# Patient Record
Sex: FEMALE | Race: OTHER | HISPANIC OR LATINO
[De-identification: names, ages, dates, MRNs, and addresses within clinical notes are randomized per-mention and may not be internally consistent; named-entity substitution may affect disease eponyms.]

---

## 2017-06-30 PROBLEM — Z00.00 ENCOUNTER FOR PREVENTIVE HEALTH EXAMINATION: Status: ACTIVE | Noted: 2017-06-30

## 2017-08-16 ENCOUNTER — LABORATORY RESULT (OUTPATIENT)
Age: 47
End: 2017-08-16

## 2017-08-16 ENCOUNTER — APPOINTMENT (OUTPATIENT)
Dept: RHEUMATOLOGY | Facility: CLINIC | Age: 47
End: 2017-08-16
Payer: COMMERCIAL

## 2017-08-16 VITALS
BODY MASS INDEX: 31.07 KG/M2 | RESPIRATION RATE: 17 BRPM | DIASTOLIC BLOOD PRESSURE: 68 MMHG | HEART RATE: 75 BPM | SYSTOLIC BLOOD PRESSURE: 114 MMHG | WEIGHT: 144 LBS | OXYGEN SATURATION: 95 % | HEIGHT: 57 IN

## 2017-08-16 DIAGNOSIS — Z82.61 FAMILY HISTORY OF ARTHRITIS: ICD-10-CM

## 2017-08-16 DIAGNOSIS — Z83.3 FAMILY HISTORY OF DIABETES MELLITUS: ICD-10-CM

## 2017-08-16 DIAGNOSIS — Z87.42 PERSONAL HISTORY OF OTHER DISEASES OF THE FEMALE GENITAL TRACT: ICD-10-CM

## 2017-08-16 DIAGNOSIS — R73.03 PREDIABETES.: ICD-10-CM

## 2017-08-16 DIAGNOSIS — Z87.19 PERSONAL HISTORY OF OTHER DISEASES OF THE DIGESTIVE SYSTEM: ICD-10-CM

## 2017-08-16 PROCEDURE — 99205 OFFICE O/P NEW HI 60 MIN: CPT | Mod: 25

## 2017-08-16 PROCEDURE — 36415 COLL VENOUS BLD VENIPUNCTURE: CPT

## 2017-08-19 LAB
ACE BLD-CCNC: 28 U/L
ALBUMIN SERPL ELPH-MCNC: 4.3 G/DL
ALP BLD-CCNC: 80 U/L
ALT SERPL-CCNC: 34 U/L
ANA PAT FLD IF-IMP: ABNORMAL
ANA SER IF-ACNC: ABNORMAL
ANION GAP SERPL CALC-SCNC: 12 MMOL/L
AST SERPL-CCNC: 27 U/L
B BURGDOR IGG+IGM SER QL IB: NORMAL
BASOPHILS # BLD AUTO: 0.01 K/UL
BASOPHILS NFR BLD AUTO: 0.2 %
BILIRUB SERPL-MCNC: 0.6 MG/DL
BUN SERPL-MCNC: 13 MG/DL
CALCIUM SERPL-MCNC: 8.9 MG/DL
CCP AB SER IA-ACNC: <8 UNITS
CHLORIDE SERPL-SCNC: 106 MMOL/L
CK SERPL-CCNC: 146 U/L
CO2 SERPL-SCNC: 23 MMOL/L
CREAT SERPL-MCNC: 0.77 MG/DL
CRP SERPL-MCNC: 0.8 MG/DL
DEPRECATED KAPPA LC FREE/LAMBDA SER: 1.16 RATIO
DSDNA AB SER-ACNC: <12 IU/ML
ENA SS-A AB SER IA-ACNC: <0.2 AL
ENA SS-B AB SER IA-ACNC: <0.2 AL
ENDOMYSIUM IGA SER QL: NORMAL
ENDOMYSIUM IGA TITR SER: NORMAL
EOSINOPHIL # BLD AUTO: 0.05 K/UL
EOSINOPHIL NFR BLD AUTO: 1.2 %
ERYTHROCYTE [SEDIMENTATION RATE] IN BLOOD BY WESTERGREN METHOD: 19 MM/HR
GLIADIN IGA SER QL: 6.9 UNITS
GLIADIN IGG SER QL: <5 UNITS
GLIADIN PEPTIDE IGA SER-ACNC: NEGATIVE
GLIADIN PEPTIDE IGG SER-ACNC: NEGATIVE
GLUCOSE SERPL-MCNC: 87 MG/DL
HAV IGM SER QL: NONREACTIVE
HBV CORE IGM SER QL: NONREACTIVE
HBV SURFACE AG SER QL: NONREACTIVE
HCT VFR BLD CALC: 36.4 %
HCV AB SER QL: NONREACTIVE
HCV S/CO RATIO: 0.16 S/CO
HGB BLD-MCNC: 11.5 G/DL
IGA SER QL IEP: 315 MG/DL
IGG SER QL IEP: 1390 MG/DL
IGM SER QL IEP: 123 MG/DL
IMM GRANULOCYTES NFR BLD AUTO: 0 %
KAPPA LC CSF-MCNC: 1.91 MG/DL
KAPPA LC SERPL-MCNC: 2.22 MG/DL
LDH SERPL-CCNC: 167 U/L
LYMPHOCYTES # BLD AUTO: 1.89 K/UL
LYMPHOCYTES NFR BLD AUTO: 46.1 %
M PROTEIN SPEC IFE-MCNC: NORMAL
MAGNESIUM SERPL-MCNC: 2.2 MG/DL
MAN DIFF?: NORMAL
MCHC RBC-ENTMCNC: 26.6 PG
MCHC RBC-ENTMCNC: 31.6 GM/DL
MCV RBC AUTO: 84.3 FL
MONOCYTES # BLD AUTO: 0.37 K/UL
MONOCYTES NFR BLD AUTO: 9 %
NEUTROPHILS # BLD AUTO: 1.78 K/UL
NEUTROPHILS NFR BLD AUTO: 43.5 %
PHOSPHATE SERPL-MCNC: 3.2 MG/DL
PLATELET # BLD AUTO: 222 K/UL
POTASSIUM SERPL-SCNC: 4.4 MMOL/L
PROT SERPL-MCNC: 7.6 G/DL
RBC # BLD: 4.32 M/UL
RBC # FLD: 15.3 %
RF+CCP IGG SER-IMP: NEGATIVE
RHEUMATOID FACT SER QL: 28 IU/ML
SODIUM SERPL-SCNC: 141 MMOL/L
THYROGLOB AB SERPL-ACNC: <20 IU/ML
THYROPEROXIDASE AB SERPL IA-ACNC: <10 IU/ML
TTG IGA SER IA-ACNC: <5 UNITS
TTG IGA SER-ACNC: NEGATIVE
TTG IGG SER IA-ACNC: 7.3 UNITS
TTG IGG SER IA-ACNC: NEGATIVE
URATE SERPL-MCNC: 4.7 MG/DL
WBC # FLD AUTO: 4.1 K/UL

## 2017-08-20 PROBLEM — Z83.3 FAMILY HISTORY OF DIABETES MELLITUS: Status: ACTIVE | Noted: 2017-08-16

## 2017-08-20 PROBLEM — Z82.61 FAMILY HISTORY OF ARTHRITIS: Status: ACTIVE | Noted: 2017-08-16

## 2017-08-20 PROBLEM — Z87.19 HISTORY OF GASTRITIS: Status: RESOLVED | Noted: 2017-08-16 | Resolved: 2017-08-20

## 2017-08-20 PROBLEM — Z87.42 HISTORY OF IRREGULAR MENSTRUAL CYCLES: Status: RESOLVED | Noted: 2017-08-20 | Resolved: 2017-08-20

## 2017-08-20 RX ORDER — PANTOPRAZOLE 40 MG/1
40 TABLET, DELAYED RELEASE ORAL
Refills: 0 | Status: ACTIVE | COMMUNITY

## 2017-08-25 LAB — HLA-B27 RELATED AG QL: NORMAL

## 2018-10-15 ENCOUNTER — APPOINTMENT (OUTPATIENT)
Dept: UROLOGY | Facility: CLINIC | Age: 48
End: 2018-10-15
Payer: COMMERCIAL

## 2018-10-15 VITALS
HEIGHT: 57 IN | BODY MASS INDEX: 31.28 KG/M2 | DIASTOLIC BLOOD PRESSURE: 74 MMHG | SYSTOLIC BLOOD PRESSURE: 109 MMHG | HEART RATE: 70 BPM | OXYGEN SATURATION: 97 % | WEIGHT: 145 LBS

## 2018-10-15 DIAGNOSIS — R10.9 UNSPECIFIED ABDOMINAL PAIN: ICD-10-CM

## 2018-10-15 DIAGNOSIS — R30.0 DYSURIA: ICD-10-CM

## 2018-10-15 PROCEDURE — 99204 OFFICE O/P NEW MOD 45 MIN: CPT | Mod: 25

## 2018-10-15 PROCEDURE — 51798 US URINE CAPACITY MEASURE: CPT

## 2018-10-16 LAB
APPEARANCE: ABNORMAL
BACTERIA: ABNORMAL
BILIRUBIN URINE: NEGATIVE
BLOOD URINE: ABNORMAL
COLOR: YELLOW
GLUCOSE QUALITATIVE U: NEGATIVE MG/DL
HYALINE CASTS: 4 /LPF
KETONES URINE: NEGATIVE
LEUKOCYTE ESTERASE URINE: ABNORMAL
MICROSCOPIC-UA: NORMAL
NITRITE URINE: NEGATIVE
PH URINE: 5.5
PROTEIN URINE: NEGATIVE MG/DL
RED BLOOD CELLS URINE: 6 /HPF
SPECIFIC GRAVITY URINE: 1.01
SQUAMOUS EPITHELIAL CELLS: 1 /HPF
UROBILINOGEN URINE: NEGATIVE MG/DL
WHITE BLOOD CELLS URINE: 133 /HPF

## 2018-10-19 LAB — BACTERIA UR CULT: ABNORMAL

## 2018-10-29 ENCOUNTER — APPOINTMENT (OUTPATIENT)
Dept: UROLOGY | Facility: CLINIC | Age: 48
End: 2018-10-29

## 2018-10-29 DIAGNOSIS — N39.0 URINARY TRACT INFECTION, SITE NOT SPECIFIED: ICD-10-CM

## 2019-01-08 ENCOUNTER — APPOINTMENT (OUTPATIENT)
Dept: RHEUMATOLOGY | Facility: CLINIC | Age: 49
End: 2019-01-08
Payer: MEDICAID

## 2019-01-08 VITALS
WEIGHT: 145 LBS | HEART RATE: 78 BPM | HEIGHT: 57 IN | BODY MASS INDEX: 31.28 KG/M2 | TEMPERATURE: 99.6 F | SYSTOLIC BLOOD PRESSURE: 100 MMHG | DIASTOLIC BLOOD PRESSURE: 70 MMHG | OXYGEN SATURATION: 98 % | RESPIRATION RATE: 17 BRPM

## 2019-01-08 DIAGNOSIS — G47.00 INSOMNIA, UNSPECIFIED: ICD-10-CM

## 2019-01-08 DIAGNOSIS — Z87.39 PERSONAL HISTORY OF OTHER DISEASES OF THE MUSCULOSKELETAL SYSTEM AND CONNECTIVE TISSUE: ICD-10-CM

## 2019-01-08 PROCEDURE — 99214 OFFICE O/P EST MOD 30 MIN: CPT

## 2019-01-08 RX ORDER — CYCLOBENZAPRINE HYDROCHLORIDE 10 MG/1
10 TABLET, FILM COATED ORAL
Qty: 30 | Refills: 3 | Status: DISCONTINUED | COMMUNITY
Start: 2017-08-16 | End: 2019-01-08

## 2019-01-08 RX ORDER — CELECOXIB 200 MG/1
200 CAPSULE ORAL
Qty: 30 | Refills: 3 | Status: DISCONTINUED | COMMUNITY
Start: 2017-08-16 | End: 2019-01-08

## 2019-01-08 RX ORDER — SULFAMETHOXAZOLE AND TRIMETHOPRIM 800; 160 MG/1; MG/1
800-160 TABLET ORAL TWICE DAILY
Qty: 14 | Refills: 2 | Status: DISCONTINUED | COMMUNITY
Start: 2018-10-15 | End: 2019-01-08

## 2019-01-08 RX ORDER — PHENAZOPYRIDINE HYDROCHLORIDE 200 MG/1
200 TABLET ORAL
Qty: 9 | Refills: 0 | Status: DISCONTINUED | COMMUNITY
Start: 2018-10-15 | End: 2019-01-08

## 2019-01-08 RX ORDER — CIPROFLOXACIN HYDROCHLORIDE 500 MG/1
500 TABLET, FILM COATED ORAL TWICE DAILY
Qty: 14 | Refills: 1 | Status: DISCONTINUED | COMMUNITY
Start: 2018-10-19 | End: 2019-01-08

## 2019-01-08 RX ORDER — NITROFURANTOIN (MONOHYDRATE/MACROCRYSTALS) 25; 75 MG/1; MG/1
100 CAPSULE ORAL
Qty: 14 | Refills: 0 | Status: DISCONTINUED | COMMUNITY
Start: 2018-10-15 | End: 2019-01-08

## 2019-01-08 NOTE — CONSULT LETTER
[Dear  ___] : Dear  [unfilled], [Consult Letter:] : I had the pleasure of evaluating your patient, [unfilled]. [Please see my note below.] : Please see my note below. [Consult Closing:] : Thank you very much for allowing me to participate in the care of this patient.  If you have any questions, please do not hesitate to contact me. [Sincerely,] : Sincerely, [FreeTextEntry3] : Yohan Hale II, MD\par \par Attending Rheumatologist\par Division of Rheumatology\par [facility]\par     [facility] [facility] &\par     Rheumatology at [place],\par     [facility]\par \par \par [facility]\par \par Contact:\par    [phone], fax [phone] ([place] office)\par    [phone], fax [phone] ([place] office)\par

## 2019-01-08 NOTE — HISTORY OF PRESENT ILLNESS
[___ Month(s) Ago] : [unfilled] month(s) ago [FreeTextEntry1] : - pt still w/ persistent pain diffusely in her body, in particular her lateral elbows, upper arms, upper back, and knees at times\par - last labs showing weak +MEEK 1:80 and borderline RF which is not clinically significant, with negative secondary serologies.  She had trace elevated inflammatory markers w/ ESR/CRP, also not suspect for inflammatory arthritis. She had normal immunoglobulins and CBC/CMP.\par - also recently has been c/o dizziness a/w headaches, sinus pressure. She has an upcoming referral to have this evaluated. She has occasional headaches that are very bothersome, reports at times temporal and occiptal pain along with frontal maxillary and frontal sinus tenderness. She has had some watery nasal discharge at times.  Was given Fioricet by PCP and diclofenac to take prn which was not very helpful.\par - she denies any swelling, redness, warmth of her joints\par - she has diffuse myalgia in her upper back, arms, and legs at times.\par - ROS: denies constitutional sxs of fever/chills, weight loss, alopecia, oral/nasal ulcers, sicca sxs, CP/SOB, hematuria/frothy urine, Raynaud's, rash, nodules, or photosensitivity.\par

## 2019-01-08 NOTE — ASSESSMENT
[FreeTextEntry1] : 48y F w/ chronic history of arthralgias, paraspinal spasm, and myalgia.  She has mild degenerative osteoarthritis of her knees and R shoulder.  She also has some mild bilateral low back pain with with radiation to the bilateral lower extremities as far as the bilateral ankles. \par She has multiple tender points, diffuse myalgia, hyperesthesia, poor restorative sleep and insomnia, chronic fatigue secondary to fibromyalgia. She has bilateral bicipital tendonitis, bilateral lateral epicondylitis, and bilateral anserine bursitis. She has not been exercising as much as previously due to some of her pain.  \par Previous labs/serologies reviewed and no evidence of systemic inflammatory autoimmune rheumatic disease \par \par - Continue other current medications (other than those listed below) \par - Continue daily exercise for at least 30 minutes per day-emphasized \par - start duloxetine 30mg/d. R/B/A reviewed\par - start amitriptyline 10mg QHS (aid in sleep and FM)\par \par RTO 6 weeks

## 2019-01-08 NOTE — PHYSICAL EXAM
[General Appearance - Alert] : alert [General Appearance - In No Acute Distress] : in no acute distress [General Appearance - Well Nourished] : well nourished [General Appearance - Well Developed] : well developed [General Appearance - Well-Appearing] : healthy appearing [Sclera] : the sclera and conjunctiva were normal [PERRL With Normal Accommodation] : pupils were equal in size, round, and reactive to light [Extraocular Movements] : extraocular movements were intact [Outer Ear] : the ears and nose were normal in appearance [Hearing Threshold Finger Rub Not [place]] : hearing was normal [Examination Of The Oral Cavity] : the lips and gums were normal [Nasal Cavity] : the nasal mucosa and septum were normal [Oropharynx] : the oropharynx was normal [Neck Appearance] : the appearance of the neck was normal [Neck Cervical Mass (___cm)] : no neck mass was observed [Jugular Venous Distention Increased] : there was no jugular-venous distention [Thyroid Diffuse Enlargement] : the thyroid was not enlarged [Thyroid Nodule] : there were no palpable thyroid nodules [Respiration, Rhythm And Depth] : normal respiratory rhythm and effort [Auscultation Breath Sounds / Voice Sounds] : lungs were clear to auscultation bilaterally [Lungs Percussion] : the lungs were normal to percussion [Heart Rate And Rhythm] : heart rate was normal and rhythm regular [Heart Sounds] : normal S1 and S2 [Heart Sounds Gallop] : no gallops [Murmurs] : no murmurs [Heart Sounds Pericardial Friction Rub] : no pericardial rub [Full Pulse] : the pedal pulses are present [Edema] : there was no peripheral edema [Veins - Varicosity Changes] : there were no varicosital changes [Bowel Sounds] : normal bowel sounds [Abdomen Soft] : soft [Abdomen Tenderness] : non-tender [Abdomen Mass (___ Cm)] : no abdominal mass palpated [Cervical Lymph Nodes Enlarged Posterior Bilaterally] : posterior cervical [Cervical Lymph Nodes Enlarged Anterior Bilaterally] : anterior cervical [Supraclavicular Lymph Nodes Enlarged Bilaterally] : supraclavicular [Axillary Lymph Nodes Enlarged Bilaterally] : axillary [No CVA Tenderness] : no ~M costovertebral angle tenderness [No Spinal Tenderness] : no spinal tenderness [Abnormal Walk] : normal gait [Nail Clubbing] : no clubbing  or cyanosis of the fingernails [Musculoskeletal - Swelling] : no joint swelling seen [Motor Tone] : muscle strength and tone were normal [FreeTextEntry1] : \par Hands- normal\par Wrists- normal\par Elbows- tender lateral epicondyles\par Shoulders- tender bicipital tendons\par Hips- tender greater trochanters, tender thighs. Reduced external rotation bilaterally. \par Knees- tender inferomedially @ pes anserine; Patellofemoral crepitus bilaterally without effusion. \par Ankles- normal\par Feet- normal\par Back - tender cervical paraspinals/trapezius; tender lumbar paraspinals; tender gluteals\par Neck - tender 2nd ribs anteriorly, tender occiput\par MMT 10/10 proximally/distally bilaterally. +myalgia/TTP \par feet- tender right 1st and 2nd MTPs\par \par \par   [Skin Color & Pigmentation] : normal skin color and pigmentation [Skin Turgor] : normal skin turgor [] : no rash [Skin Lesions] : no skin lesions [Cranial Nerves] : cranial nerves 2-12 were intact [Deep Tendon Reflexes (DTR)] : deep tendon reflexes were 2+ and symmetric [Sensation] : the sensory exam was normal to light touch and pinprick [Motor Exam] : the motor exam was normal [No Focal Deficits] : no focal deficits [Oriented To Time, Place, And Person] : oriented to person, place, and time [Impaired Insight] : insight and judgment were intact [Affect] : the affect was normal [Mood] : the mood was normal

## 2019-01-08 NOTE — REVIEW OF SYSTEMS
[Feeling Tired] : feeling tired [Dry Eyes] : no dryness of the eyes [As Noted in HPI] : as noted in HPI [Negative] : Heme/Lymph

## 2019-01-08 NOTE — ADDENDUM
[FreeTextEntry1] : This note was written by Chelsea Dotson on 8/16/17 acting as a scribe for Dr. Myron Kleiner M.D.\par \par I, Dr. Myron Kleiner, have read and attest that all the information, medical decision making and discharge instructions within are true and accurate.

## 2019-02-13 PROBLEM — N39.0 RECURRENT UTI: Status: ACTIVE | Noted: 2018-10-15

## 2019-02-19 ENCOUNTER — APPOINTMENT (OUTPATIENT)
Dept: RHEUMATOLOGY | Facility: CLINIC | Age: 49
End: 2019-02-19

## 2019-04-08 ENCOUNTER — APPOINTMENT (OUTPATIENT)
Dept: UROLOGY | Facility: CLINIC | Age: 49
End: 2019-04-08

## 2020-03-23 ENCOUNTER — APPOINTMENT (OUTPATIENT)
Dept: RHEUMATOLOGY | Facility: CLINIC | Age: 50
End: 2020-03-23

## 2020-06-10 ENCOUNTER — EMERGENCY (EMERGENCY)
Facility: HOSPITAL | Age: 50
LOS: 1 days | Discharge: HOME | End: 2020-06-10
Attending: EMERGENCY MEDICINE
Payer: COMMERCIAL

## 2020-06-10 VITALS
OXYGEN SATURATION: 99 % | TEMPERATURE: 98 F | SYSTOLIC BLOOD PRESSURE: 128 MMHG | HEART RATE: 65 BPM | RESPIRATION RATE: 18 BRPM | WEIGHT: 141.98 LBS | DIASTOLIC BLOOD PRESSURE: 83 MMHG | HEIGHT: 57 IN

## 2020-06-10 LAB
APPEARANCE UR: CLEAR — SIGNIFICANT CHANGE UP
BACTERIA # UR AUTO: NEGATIVE — SIGNIFICANT CHANGE UP
BILIRUB UR-MCNC: NEGATIVE — SIGNIFICANT CHANGE UP
COLOR SPEC: YELLOW — SIGNIFICANT CHANGE UP
DIFF PNL FLD: ABNORMAL
EPI CELLS # UR: SIGNIFICANT CHANGE UP
GLUCOSE UR QL: NEGATIVE MG/DL — SIGNIFICANT CHANGE UP
HCG UR QL: NEGATIVE — SIGNIFICANT CHANGE UP
KETONES UR-MCNC: NEGATIVE — SIGNIFICANT CHANGE UP
LEUKOCYTE ESTERASE UR-ACNC: NEGATIVE — SIGNIFICANT CHANGE UP
NITRITE UR-MCNC: NEGATIVE — SIGNIFICANT CHANGE UP
PH UR: 7 — SIGNIFICANT CHANGE UP (ref 5–8)
PROT UR-MCNC: NEGATIVE MG/DL — SIGNIFICANT CHANGE UP
RBC CASTS # UR COMP ASSIST: SIGNIFICANT CHANGE UP /HPF (ref 0–4)
SP GR SPEC: 1 — LOW (ref 1.01–1.02)
UROBILINOGEN FLD QL: NEGATIVE MG/DL — SIGNIFICANT CHANGE UP
WBC UR QL: SIGNIFICANT CHANGE UP

## 2020-06-10 PROCEDURE — T1013: CPT

## 2020-06-10 PROCEDURE — 81025 URINE PREGNANCY TEST: CPT

## 2020-06-10 PROCEDURE — 82962 GLUCOSE BLOOD TEST: CPT

## 2020-06-10 PROCEDURE — 81001 URINALYSIS AUTO W/SCOPE: CPT

## 2020-06-10 PROCEDURE — 99283 EMERGENCY DEPT VISIT LOW MDM: CPT

## 2020-06-10 PROCEDURE — 99284 EMERGENCY DEPT VISIT MOD MDM: CPT

## 2020-06-10 RX ORDER — GABAPENTIN 400 MG/1
1 CAPSULE ORAL
Qty: 28 | Refills: 0
Start: 2020-06-10 | End: 2020-06-23

## 2020-06-10 NOTE — ED STATDOCS - OBJECTIVE STATEMENT
50 y/o F pt with no significant PMHx presents to the ED c/o left-sided flank pain that began a month ago. She states that she tried a muscle relaxer which did not help. She describes the pain as burning and reports that she feels it in her feet as well.  No further complaints at this time.

## 2020-06-10 NOTE — ED ADULT TRIAGE NOTE - CHIEF COMPLAINT QUOTE
patient c/o left sided flank pain for the past month, went to Clinic gave muscle relaxer but didn't work

## 2020-06-10 NOTE — ED STATDOCS - CARE PROVIDER_API CALL
Shane Varner  INTERNAL MEDICINE  [address] [place], [place] [id number]  Phone: [phone]  Fax: [phone]  Follow Up Time:

## 2020-06-10 NOTE — ED STATDOCS - PATIENT PORTAL LINK FT
You can access the FollowMyHealth Patient Portal offered by [facility] by registering at the following website: [email or web address]. By joining [employer]’s FollowMyHealth portal, you will also be able to view your health information using other applications (apps) compatible with our system.

## 2020-06-10 NOTE — ED STATDOCS - ATTENDING CONTRIBUTION TO CARE
I, Esperanza Renee, performed the initial face to face bedside interview with this patient regarding history of present illness, review of symptoms and relevant past medical, social and family history.  I completed an independent physical examination.  I was the initial provider who evaluated this patient. I have signed out the follow up of any pending tests (i.e. labs, radiological studies) to the ACP.  I have communicated the patient’s plan of care and disposition with the ACP.  The history, relevant review of systems, past medical and surgical history, medical decision making, and physical examination was documented by the scribe in my presence and I attest to the accuracy of the documentation.

## 2020-08-10 PROBLEM — Z78.9 OTHER SPECIFIED HEALTH STATUS: Chronic | Status: ACTIVE | Noted: 2020-06-11

## 2020-09-15 ENCOUNTER — APPOINTMENT (OUTPATIENT)
Dept: RHEUMATOLOGY | Facility: CLINIC | Age: 50
End: 2020-09-15
Payer: MEDICAID

## 2020-09-15 VITALS
TEMPERATURE: 98.6 F | SYSTOLIC BLOOD PRESSURE: 123 MMHG | OXYGEN SATURATION: 98 % | BODY MASS INDEX: 25.46 KG/M2 | DIASTOLIC BLOOD PRESSURE: 76 MMHG | HEART RATE: 86 BPM | WEIGHT: 118 LBS | HEIGHT: 57 IN

## 2020-09-15 DIAGNOSIS — M77.12 LATERAL EPICONDYLITIS, RIGHT ELBOW: ICD-10-CM

## 2020-09-15 DIAGNOSIS — M77.11 LATERAL EPICONDYLITIS, RIGHT ELBOW: ICD-10-CM

## 2020-09-15 DIAGNOSIS — M75.22 BICIPITAL TENDINITIS, LEFT SHOULDER: ICD-10-CM

## 2020-09-15 DIAGNOSIS — M75.21 BICIPITAL TENDINITIS, RIGHT SHOULDER: ICD-10-CM

## 2020-09-15 DIAGNOSIS — R52 PAIN, UNSPECIFIED: ICD-10-CM

## 2020-09-15 PROCEDURE — 99214 OFFICE O/P EST MOD 30 MIN: CPT

## 2020-09-15 RX ORDER — AMITRIPTYLINE HYDROCHLORIDE 10 MG/1
10 TABLET, FILM COATED ORAL
Qty: 30 | Refills: 2 | Status: DISCONTINUED | COMMUNITY
Start: 2019-01-08 | End: 2020-09-15

## 2020-09-15 RX ORDER — GABAPENTIN 300 MG/1
300 CAPSULE ORAL
Qty: 30 | Refills: 0 | Status: ACTIVE | COMMUNITY
Start: 2020-09-15 | End: 1900-01-01

## 2020-09-15 RX ORDER — DULOXETINE HYDROCHLORIDE 30 MG/1
30 CAPSULE, DELAYED RELEASE PELLETS ORAL
Qty: 30 | Refills: 2 | Status: DISCONTINUED | COMMUNITY
Start: 2019-01-08 | End: 2020-09-15

## 2020-09-15 RX ORDER — MELOXICAM 15 MG/1
15 TABLET ORAL
Qty: 30 | Refills: 2 | Status: ACTIVE | COMMUNITY
Start: 2020-09-15 | End: 1900-01-01

## 2020-09-21 PROBLEM — M75.21 BICIPITAL TENDINITIS OF RIGHT SHOULDER: Status: ACTIVE | Noted: 2017-08-16

## 2020-09-21 PROBLEM — M75.22 BICIPITAL TENDINITIS, LEFT SHOULDER: Status: ACTIVE | Noted: 2017-08-16

## 2020-09-21 PROBLEM — R52 TOTAL BODY PAIN: Status: ACTIVE | Noted: 2020-09-15

## 2020-09-21 PROBLEM — M77.11 LATERAL EPICONDYLITIS OF BOTH ELBOWS: Status: ACTIVE | Noted: 2020-09-15

## 2020-09-21 NOTE — PHYSICAL EXAM
[General Appearance - Alert] : alert [General Appearance - In No Acute Distress] : in no acute distress [General Appearance - Well Nourished] : well nourished [General Appearance - Well Developed] : well developed [General Appearance - Well-Appearing] : healthy appearing [Sclera] : the sclera and conjunctiva were normal [PERRL With Normal Accommodation] : pupils were equal in size, round, and reactive to light [Extraocular Movements] : extraocular movements were intact [Outer Ear] : the ears and nose were normal in appearance [Hearing Threshold Finger Rub Not [place]] : hearing was normal [Examination Of The Oral Cavity] : the lips and gums were normal [Nasal Cavity] : the nasal mucosa and septum were normal [Oropharynx] : the oropharynx was normal [Neck Appearance] : the appearance of the neck was normal [Neck Cervical Mass (___cm)] : no neck mass was observed [Jugular Venous Distention Increased] : there was no jugular-venous distention [Thyroid Diffuse Enlargement] : the thyroid was not enlarged [Thyroid Nodule] : there were no palpable thyroid nodules [Respiration, Rhythm And Depth] : normal respiratory rhythm and effort [Auscultation Breath Sounds / Voice Sounds] : lungs were clear to auscultation bilaterally [Lungs Percussion] : the lungs were normal to percussion [Heart Rate And Rhythm] : heart rate was normal and rhythm regular [Heart Sounds] : normal S1 and S2 [Heart Sounds Gallop] : no gallops [Murmurs] : no murmurs [Heart Sounds Pericardial Friction Rub] : no pericardial rub [Full Pulse] : the pedal pulses are present [Edema] : there was no peripheral edema [Veins - Varicosity Changes] : there were no varicosital changes [Bowel Sounds] : normal bowel sounds [Abdomen Soft] : soft [Abdomen Tenderness] : non-tender [Abdomen Mass (___ Cm)] : no abdominal mass palpated [Cervical Lymph Nodes Enlarged Posterior Bilaterally] : posterior cervical [Cervical Lymph Nodes Enlarged Anterior Bilaterally] : anterior cervical [Supraclavicular Lymph Nodes Enlarged Bilaterally] : supraclavicular [Axillary Lymph Nodes Enlarged Bilaterally] : axillary [No CVA Tenderness] : no ~M costovertebral angle tenderness [No Spinal Tenderness] : no spinal tenderness [Abnormal Walk] : normal gait [Nail Clubbing] : no clubbing  or cyanosis of the fingernails [Musculoskeletal - Swelling] : no joint swelling seen [Motor Tone] : muscle strength and tone were normal [Skin Color & Pigmentation] : normal skin color and pigmentation [Skin Turgor] : normal skin turgor [] : no rash [Skin Lesions] : no skin lesions [Cranial Nerves] : cranial nerves 2-12 were intact [Deep Tendon Reflexes (DTR)] : deep tendon reflexes were 2+ and symmetric [Sensation] : the sensory exam was normal to light touch and pinprick [Motor Exam] : the motor exam was normal [No Focal Deficits] : no focal deficits [Oriented To Time, Place, And Person] : oriented to person, place, and time [Impaired Insight] : insight and judgment were intact [Affect] : the affect was normal [Mood] : the mood was normal [FreeTextEntry1] : \par Hands- normal\par Wrists- normal\par Elbows- tender lateral epicondyles\par Shoulders- tender bicipital tendons\par Hips- tender greater trochanters, tender thighs. Reduced external rotation bilaterally. \par Knees- tender inferomedially @ pes anserine; Patellofemoral crepitus bilaterally without effusion. \par Ankles- normal\par Feet- normal\par Back - tender cervical paraspinals/trapezius; tender lumbar paraspinals; tender gluteals\par Neck - tender 2nd ribs anteriorly, tender occiput\par MMT 10/10 proximally/distally bilaterally. +myalgia/TTP \par feet- tender right 1st and 2nd MTPs\par \par \par

## 2020-09-21 NOTE — CONSULT LETTER
[Dear  ___] : Dear  [unfilled], [Consult Letter:] : I had the pleasure of evaluating your patient, [unfilled]. [Please see my note below.] : Please see my note below. [Consult Closing:] : Thank you very much for allowing me to participate in the care of this patient.  If you have any questions, please do not hesitate to contact me. [Sincerely,] : Sincerely, [FreeTextEntry3] : Yohan Hale II, MD\par \par Attending Rheumatologist\par Division of Rheumatology\par [facility]\par     [place] [facility] &\par     Rheumatology at [place],\par     [facility]\par \par \par [facility] at [facility]\par \par Contact:\par    [phone], fax [phone] ([place] office)\par    [phone], fax [phone] ([place] office)\par

## 2020-09-21 NOTE — REVIEW OF SYSTEMS
[Feeling Tired] : feeling tired [As Noted in HPI] : as noted in HPI [Negative] : Heme/Lymph [Dry Eyes] : no dryness of the eyes

## 2020-09-21 NOTE — ASSESSMENT
[FreeTextEntry1] : 50y F with FMA and chronic polyarthralgia.  She has a history of chronic history of arthralgias, paraspinal spasm, and myalgia.  She has mild degenerative osteoarthritis of her knees and R shoulder.  She also has some mild bilateral low back pain with with radiation to the bilateral lower extremities as far as the bilateral ankles. \par She has multiple tender points, diffuse myalgia, hyperesthesia, poor restorative sleep and insomnia, chronic fatigue secondary to fibromyalgia. She has bilateral bicipital tendonitis, bilateral lateral epicondylitis, and bilateral anserine bursitis. She has not been exercising as much as previously due to some of her pain.  \par Previous labs/serologies reviewed and no evidence of systemic inflammatory autoimmune rheumatic disease \par Discussed pt need for f/u and medication may take time for effect and need dosing adjustments\par \par Pt reports she cannot be sedate during the day, we discussed beginning QHS treatment w/ gabapentinoid onnly and NSAID for now. Using milder NSAID due to history gastritis\par \par - start meloxicam 15mg/d\par - start gabapentin 300mg QHS\par \par RTO 6-8 weeks

## 2020-09-21 NOTE — HISTORY OF PRESENT ILLNESS
[___ Month(s) Ago] : [unfilled] month(s) ago [FreeTextEntry1] : - not seen since 1/2019\par - pt still w/ persistent pain diffusely in her body, in particular her lateral elbows, upper arms, upper back, and knees at times\par - last labs showing weak +MEEK 1:80 and borderline RF which is not clinically significant, with negative secondary serologies.  She had trace elevated inflammatory markers w/ ESR/CRP, also not suspect for inflammatory arthritis. She had normal immunoglobulins and CBC/CMP.\par - also recently has been c/o dizziness a/w headaches, sinus pressure. She has an upcoming referral to have this evaluated. She has occasional headaches that are very bothersome, reports at times temporal and occiptal pain along with frontal maxillary and frontal sinus tenderness. She has had some watery nasal discharge at times.  Was given Fioricet by PCP and diclofenac to take prn which was not very helpful.\par - she denies any swelling, redness, warmth of her joints\par - she has diffuse myalgia in her upper back, arms, and legs at times.\par - ROS: denies constitutional sxs of fever/chills, weight loss, alopecia, oral/nasal ulcers, sicca sxs, CP/SOB, hematuria/frothy urine, Raynaud's, rash, nodules, or photosensitivity.\par

## 2020-09-29 ENCOUNTER — APPOINTMENT (OUTPATIENT)
Dept: UROGYNECOLOGY | Facility: CLINIC | Age: 50
End: 2020-09-29

## 2020-10-06 ENCOUNTER — APPOINTMENT (OUTPATIENT)
Dept: RHEUMATOLOGY | Facility: CLINIC | Age: 50
End: 2020-10-06
Payer: MEDICAID

## 2020-10-06 VITALS
WEIGHT: 133 LBS | BODY MASS INDEX: 28.69 KG/M2 | OXYGEN SATURATION: 99 % | HEART RATE: 69 BPM | TEMPERATURE: 99.3 F | DIASTOLIC BLOOD PRESSURE: 69 MMHG | SYSTOLIC BLOOD PRESSURE: 100 MMHG | HEIGHT: 57 IN

## 2020-10-06 DIAGNOSIS — M19.011 PRIMARY OSTEOARTHRITIS, RIGHT SHOULDER: ICD-10-CM

## 2020-10-06 DIAGNOSIS — R20.2 PARESTHESIA OF SKIN: ICD-10-CM

## 2020-10-06 PROCEDURE — 99215 OFFICE O/P EST HI 40 MIN: CPT

## 2020-10-29 PROBLEM — R20.2 PARESTHESIA OF BOTH FEET: Status: ACTIVE | Noted: 2020-09-15

## 2020-10-29 PROBLEM — M19.011 OSTEOARTHRITIS OF RIGHT GLENOHUMERAL JOINT: Status: ACTIVE | Noted: 2019-01-08

## 2020-12-07 ENCOUNTER — APPOINTMENT (OUTPATIENT)
Dept: RHEUMATOLOGY | Facility: CLINIC | Age: 50
End: 2020-12-07
Payer: MEDICAID

## 2020-12-07 VITALS
OXYGEN SATURATION: 97 % | BODY MASS INDEX: 29.12 KG/M2 | DIASTOLIC BLOOD PRESSURE: 71 MMHG | SYSTOLIC BLOOD PRESSURE: 109 MMHG | HEART RATE: 68 BPM | WEIGHT: 135 LBS | TEMPERATURE: 98.5 F | HEIGHT: 57 IN

## 2020-12-07 DIAGNOSIS — M54.5 LOW BACK PAIN: ICD-10-CM

## 2020-12-07 DIAGNOSIS — M06.9 RHEUMATOID ARTHRITIS, UNSPECIFIED: ICD-10-CM

## 2020-12-07 DIAGNOSIS — M17.0 BILATERAL PRIMARY OSTEOARTHRITIS OF KNEE: ICD-10-CM

## 2020-12-07 DIAGNOSIS — M79.7 FIBROMYALGIA: ICD-10-CM

## 2020-12-07 DIAGNOSIS — Z79.899 OTHER LONG TERM (CURRENT) DRUG THERAPY: ICD-10-CM

## 2020-12-07 DIAGNOSIS — M25.50 PAIN IN UNSPECIFIED JOINT: ICD-10-CM

## 2020-12-07 PROCEDURE — 99215 OFFICE O/P EST HI 40 MIN: CPT | Mod: 25

## 2020-12-07 PROCEDURE — 36415 COLL VENOUS BLD VENIPUNCTURE: CPT

## 2020-12-07 PROCEDURE — 99072 ADDL SUPL MATRL&STAF TM PHE: CPT

## 2020-12-07 RX ORDER — TIZANIDINE 2 MG/1
2 TABLET ORAL
Qty: 90 | Refills: 0 | Status: ACTIVE | COMMUNITY
Start: 2020-12-07 | End: 1900-01-01

## 2020-12-07 RX ORDER — HYDROXYCHLOROQUINE SULFATE 200 MG/1
200 TABLET, FILM COATED ORAL
Qty: 60 | Refills: 2 | Status: ACTIVE | COMMUNITY
Start: 2020-10-06 | End: 1900-01-01

## 2021-03-08 ENCOUNTER — APPOINTMENT (OUTPATIENT)
Dept: RHEUMATOLOGY | Facility: CLINIC | Age: 51
End: 2021-03-08

## 2022-03-23 LAB
14-3-3 ETA AG SER IA-MCNC: <0.2 NG/ML
A PHAGOCYTOPH IGG TITR SER IF: NORMAL TITER
A-TUMOR NECROSIS FACT SERPL-MCNC: 4.5 PG/ML
ALBUMIN MFR SERPL ELPH: 56.6 %
ALBUMIN SERPL ELPH-MCNC: 4.2 G/DL
ALBUMIN SERPL ELPH-MCNC: 4.4 G/DL
ALBUMIN SERPL-MCNC: 3.9 G/DL
ALBUMIN/GLOB SERPL: 1.3 RATIO
ALP BLD-CCNC: 107 U/L
ALP BLD-CCNC: 80 U/L
ALPHA1 GLOB MFR SERPL ELPH: 3.2 %
ALPHA1 GLOB SERPL ELPH-MCNC: 0.2 G/DL
ALPHA2 GLOB MFR SERPL ELPH: 8.5 %
ALPHA2 GLOB SERPL ELPH-MCNC: 0.6 G/DL
ALT SERPL-CCNC: 31 U/L
ALT SERPL-CCNC: 38 U/L
ANACR T: NEGATIVE
ANION GAP SERPL CALC-SCNC: 10 MMOL/L
ANION GAP SERPL CALC-SCNC: 13 MMOL/L
AST SERPL-CCNC: 23 U/L
AST SERPL-CCNC: 27 U/L
B BURGDOR AB SER QL IA: NEGATIVE
B BURGDOR AB SER-IMP: NEGATIVE
B BURGDOR IGM PATRN SER IB-IMP: NEGATIVE
B BURGDOR18KD IGG SER QL IB: NORMAL
B BURGDOR23KD IGG SER QL IB: NORMAL
B BURGDOR23KD IGM SER QL IB: NORMAL
B BURGDOR28KD IGG SER QL IB: NORMAL
B BURGDOR30KD IGG SER QL IB: PRESENT
B BURGDOR31KD IGG SER QL IB: NORMAL
B BURGDOR39KD IGG SER QL IB: NORMAL
B BURGDOR39KD IGM SER QL IB: NORMAL
B BURGDOR41KD IGG SER QL IB: PRESENT
B BURGDOR41KD IGM SER QL IB: PRESENT
B BURGDOR45KD IGG SER QL IB: NORMAL
B BURGDOR58KD IGG SER QL IB: PRESENT
B BURGDOR66KD IGG SER QL IB: NORMAL
B BURGDOR93KD IGG SER QL IB: NORMAL
B MICROTI IGG TITR SER: NORMAL TITER
B-GLOBULIN MFR SERPL ELPH: 13.2 %
B-GLOBULIN SERPL ELPH-MCNC: 0.9 G/DL
BASOPHILS # BLD AUTO: 0.02 K/UL
BASOPHILS # BLD AUTO: 0.02 K/UL
BASOPHILS NFR BLD AUTO: 0.4 %
BASOPHILS NFR BLD AUTO: 0.4 %
BILIRUB SERPL-MCNC: 0.6 MG/DL
BILIRUB SERPL-MCNC: 0.8 MG/DL
BUN SERPL-MCNC: 15 MG/DL
BUN SERPL-MCNC: 24 MG/DL
CALCIUM SERPL-MCNC: 9.3 MG/DL
CALCIUM SERPL-MCNC: 9.6 MG/DL
CCP AB SER IA-ACNC: <8 UNITS
CHLORIDE SERPL-SCNC: 101 MMOL/L
CHLORIDE SERPL-SCNC: 105 MMOL/L
CO2 SERPL-SCNC: 22 MMOL/L
CO2 SERPL-SCNC: 24 MMOL/L
CREAT SERPL-MCNC: 0.54 MG/DL
CREAT SERPL-MCNC: 0.83 MG/DL
CRP SERPL-MCNC: 0.12 MG/DL
CRP SERPL-MCNC: 0.17 MG/DL
DEPRECATED KAPPA LC FREE/LAMBDA SER: 1.36 RATIO
E CHAFFEENSIS IGG TITR SER IF: NORMAL TITER
EOSINOPHIL # BLD AUTO: 0.04 K/UL
EOSINOPHIL # BLD AUTO: 0.04 K/UL
EOSINOPHIL NFR BLD AUTO: 0.8 %
EOSINOPHIL NFR BLD AUTO: 0.9 %
ERYTHROCYTE [SEDIMENTATION RATE] IN BLOOD BY WESTERGREN METHOD: 14 MM/HR
ERYTHROCYTE [SEDIMENTATION RATE] IN BLOOD BY WESTERGREN METHOD: 30 MM/HR
GAMMA GLOB FLD ELPH-MCNC: 1.3 G/DL
GAMMA GLOB MFR SERPL ELPH: 18.5 %
GLUCOSE SERPL-MCNC: 106 MG/DL
GLUCOSE SERPL-MCNC: 119 MG/DL
HCT VFR BLD CALC: 38.2 %
HCT VFR BLD CALC: 39.7 %
HGB BLD-MCNC: 11.8 G/DL
HGB BLD-MCNC: 12.5 G/DL
HLA-B27 RELATED AG QL: NORMAL
IGA SER QL IEP: 314 MG/DL
IGG SER QL IEP: 1065 MG/DL
IGM SER QL IEP: 113 MG/DL
IGNF SERPL-MCNC: <4.2 PG/ML
IL10 SERPL-MCNC: <2.8 PG/ML
IL12 SERPL-MCNC: <1.9 PG/ML
IL13 SERPL-MCNC: <1.7 PG/ML
IL17A SERPL-MCNC: <1.4 PG/ML
IL2 SERPL-MCNC: 599.5 PG/ML
IL2 SERPL-MCNC: <2.1 PG/ML
IL4 SERPL-MCNC: <2.2 PG/ML
IL6 SERPL-MCNC: <2 PG/ML
IL8 SERPL-MCNC: <3 PG/ML
IMM GRANULOCYTES NFR BLD AUTO: 0.2 %
IMM GRANULOCYTES NFR BLD AUTO: 0.2 %
INTERLEUKIN 1 BETA: <6.5 PG/ML
INTERLEUKIN 5: <2.1 PG/ML
INTERPRETATION SERPL IEP-IMP: NORMAL
KAPPA LC CSF-MCNC: 1.33 MG/DL
KAPPA LC SERPL-MCNC: 1.81 MG/DL
LDH SERPL-CCNC: 200 U/L
LYMPHOCYTES # BLD AUTO: 1.85 K/UL
LYMPHOCYTES # BLD AUTO: 2.02 K/UL
LYMPHOCYTES NFR BLD AUTO: 38 %
LYMPHOCYTES NFR BLD AUTO: 39.7 %
M PROTEIN SPEC IFE-MCNC: NORMAL
MAN DIFF?: NORMAL
MAN DIFF?: NORMAL
MCHC RBC-ENTMCNC: 27.5 PG
MCHC RBC-ENTMCNC: 28.1 PG
MCHC RBC-ENTMCNC: 30.9 GM/DL
MCHC RBC-ENTMCNC: 31.5 GM/DL
MCV RBC AUTO: 89 FL
MCV RBC AUTO: 89.2 FL
MONOCYTES # BLD AUTO: 0.3 K/UL
MONOCYTES # BLD AUTO: 0.5 K/UL
MONOCYTES NFR BLD AUTO: 6.4 %
MONOCYTES NFR BLD AUTO: 9.4 %
NEUTROPHILS # BLD AUTO: 2.44 K/UL
NEUTROPHILS # BLD AUTO: 2.73 K/UL
NEUTROPHILS NFR BLD AUTO: 51.2 %
NEUTROPHILS NFR BLD AUTO: 52.4 %
PLATELET # BLD AUTO: 155 K/UL
PLATELET # BLD AUTO: 187 K/UL
POTASSIUM SERPL-SCNC: 4.3 MMOL/L
POTASSIUM SERPL-SCNC: 4.6 MMOL/L
PROT SERPL-MCNC: 6.9 G/DL
PROT SERPL-MCNC: 7.5 G/DL
RBC # BLD: 4.29 M/UL
RBC # BLD: 4.45 M/UL
RBC # FLD: 13.4 %
RBC # FLD: 14 %
RF+CCP IGG SER-IMP: NEGATIVE
RHEUMATOID FACT SER QL: 41 IU/ML
SODIUM SERPL-SCNC: 135 MMOL/L
SODIUM SERPL-SCNC: 140 MMOL/L
URATE SERPL-MCNC: 3.7 MG/DL
WBC # FLD AUTO: 4.66 K/UL
WBC # FLD AUTO: 5.32 K/UL

## 2023-01-03 ENCOUNTER — APPOINTMENT (OUTPATIENT)
Dept: UROLOGY | Facility: CLINIC | Age: 53
End: 2023-01-03
Payer: COMMERCIAL

## 2023-01-03 VITALS — DIASTOLIC BLOOD PRESSURE: 70 MMHG | HEART RATE: 67 BPM | SYSTOLIC BLOOD PRESSURE: 108 MMHG

## 2023-01-03 DIAGNOSIS — R32 UNSPECIFIED URINARY INCONTINENCE: ICD-10-CM

## 2023-01-03 LAB
BILIRUB UR QL STRIP: NORMAL
CLARITY UR: CLEAR
COLLECTION METHOD: NORMAL
GLUCOSE UR-MCNC: NORMAL
HCG UR QL: 0.2 EU/DL
HGB UR QL STRIP.AUTO: ABNORMAL
KETONES UR-MCNC: NORMAL
LEUKOCYTE ESTERASE UR QL STRIP: NORMAL
NITRITE UR QL STRIP: NORMAL
PH UR STRIP: 5.5
PROT UR STRIP-MCNC: NORMAL
SP GR UR STRIP: 1.01

## 2023-01-03 PROCEDURE — 81003 URINALYSIS AUTO W/O SCOPE: CPT | Mod: QW

## 2023-01-03 PROCEDURE — 99203 OFFICE O/P NEW LOW 30 MIN: CPT

## 2023-01-07 NOTE — HISTORY OF PRESENT ILLNESS
[FreeTextEntry1] : c/o urinary leakage wift lifting/straining x 2 years [Urinary Incontinence] : urinary incontinence [Dysuria] : dysuria [Hematuria - Gross] : no gross hematuria [None] : None

## 2023-01-07 NOTE — ASSESSMENT
[FreeTextEntry1] :  u/a reviewed, Advise bladder diary, kegel exercises and renal/ bladder ultrasound

## 2023-02-07 ENCOUNTER — APPOINTMENT (OUTPATIENT)
Dept: UROLOGY | Facility: CLINIC | Age: 53
End: 2023-02-07

## 2024-06-06 ENCOUNTER — APPOINTMENT (OUTPATIENT)
Dept: PODIATRY | Facility: CLINIC | Age: 54
End: 2024-06-06
Payer: COMMERCIAL

## 2024-06-06 DIAGNOSIS — M72.2 PLANTAR FASCIAL FIBROMATOSIS: ICD-10-CM

## 2024-06-06 DIAGNOSIS — Q66.6 OTHER CONGENITAL VALGUS DEFORMITIES OF FEET: ICD-10-CM

## 2024-06-06 DIAGNOSIS — M79.671 PAIN IN RIGHT FOOT: ICD-10-CM

## 2024-06-06 DIAGNOSIS — M79.672 PAIN IN LEFT FOOT: ICD-10-CM

## 2024-06-06 PROCEDURE — 29540 STRAPPING ANKLE &/FOOT: CPT | Mod: 50

## 2024-06-06 PROCEDURE — 99204 OFFICE O/P NEW MOD 45 MIN: CPT | Mod: 25

## 2024-06-06 PROCEDURE — 73620 X-RAY EXAM OF FOOT: CPT | Mod: LT,RT

## 2024-06-06 RX ORDER — MELOXICAM 15 MG/1
15 TABLET ORAL DAILY
Qty: 30 | Refills: 0 | Status: ACTIVE | COMMUNITY
Start: 2024-06-06 | End: 2024-07-06

## 2024-06-10 PROBLEM — Q66.6 VALGUS DEFORMITIES OF FEET, CONGENITAL: Status: ACTIVE | Noted: 2024-06-10

## 2024-06-10 PROBLEM — M79.671 RIGHT FOOT PAIN: Status: ACTIVE | Noted: 2024-06-10

## 2024-06-10 PROBLEM — M79.672 LEFT FOOT PAIN: Status: ACTIVE | Noted: 2024-06-10

## 2024-06-10 NOTE — HISTORY OF PRESENT ILLNESS
[FreeTextEntry1] : Micaela Granado is a 53-year-old female patient who was seen for initial visit with staff member, Sailaja, in attendance for Ukrainian to English translation.  She complains of pain in both arches and heels, more the right than the left.  She states that it is getting worse over the past two months.  She notices it when she is on her feet a lot.  She denies any specific trauma or injury.  She states that her feet hurt when she stands and walks for a while.  She denies any previous treatment.

## 2024-06-10 NOTE — ASSESSMENT
[FreeTextEntry1] : Assessment: Calcaneal valgus deformities bilaterally. Plantar fasciitis with heels spur syndrome bilaterally.  Plan:   DP and lateral weightbearing radiographs were taken of both feet and revealed no fractures or dislocations.  The patient was instructed about the potential benefits of physical therapy as a conservative means of treating this condition, either independently, or as an adjunct to other conservative treatment regimens such as, injection therapy, strapping, oral anti-inflammatory medications and the usage of orthotics.  The types of physical therapy advocated for the treatment of this diagnosis includes: whirlpool and ultrasound.  The patient was instructed that physical therapy is considered therapeutic when performed 2-3 times per week. The patient was given stretching exercises to be performed for the Achilles tendon and plantar fascial regions of both feet.  I instructed the patient to perform the exercises gradually at first and then more aggressively twice per day, for 2 minutes per exercise.  The patient was given written instructions for reference. The patient was advised to limit weight-bearing activities for the next 2 weeks.  After further assessment the right and left foot was placed in approximately 90 degrees to the ankle with the foot in as close to neutral position as possible.  The foot and ankle were prepped to be clean and dry.  Pre tape spray may have been used on the foot and ankle as indicated.  Pre-tape adhesive spray provides an extra sticky, waterproof barrier, that helps tapes and strapping stick better to the skin. Perfect for oily skin, sweaty skin, water sports and extreme environments. It can be a game-changer when strapping challenging areas such as sweaty or wet ankles & feet. I then applied a strapping to the right and left foot and ankle for support and compression to help relieve pressure and symptoms related to the patient's foot/ankle problem.   The strapping can aid recovery by supporting the muscles, tendons, or ligaments around the affected painful area, as well as improving blood flow to the area. Plus, the strapping will reduce pain and improve the gait, preventing further injury from poor alignment. The strapping also provides proprioceptive feedback. The strapping can reduce the amount of stretching and moving the ligaments do when the patient is weight bearing. This not only gives tissues a better chance to heal, but it also helps prevent further damage. This strapping also minimizes foot pronation, collapse, or flattening which causes over use and irritation to the muscle, tendons, and ligaments in the foot. Often, relief of pain with the strapping is a diagnostic indication for the need of functional orthotic devices. In general, strappings may be used to treat strains, sprains, dislocations, and some fractures. Strapping the foot and ankle provides the external stabilization that stretched ligaments (tissues connecting bone to bone) need while they heal. Most studies show that strapping the foot and ankle decreases the incidence and severity of the affected area.  Besides physical support, strapping can increase biofeedback and increase proprioception, which is a body's ability to know where it is thus aiding in healing and reducing further exacerbation of the pain. The patient was instructed to leave the strapping in place for the next few days to a week, if it was comfortable.  The patient was advised to keep the strapping dry, but leave it on and dry with a hair dryer if it got wet.   The patient was further instructed that if the strapping was uncomfortable or causes any problems it should be removed right away and the office notified.  The patient was given a prescription for Meloxicam 15-mg, to treat the inflammation and reduce pain & swelling and help restore normal function. The patient was instructed to take 1 tablet per day after her major meal for 2 weeks, so that it would be less offensive on the stomach.  The patient denied being allergic to the medication and was advised to call the office if they experienced any side effects.  The patient was instructed as to the potential biomechanical and protective benefits that orthotic therapy could provide.  The patient was educated in the process of how orthotic devices would promote improved foot function and shock absorption.  A thorough explanation was given to the patient in reference to the benefits orthotic devices may provide in not only relieving the current symptoms, but in also preventing possible recurrence and/or secondary biomechanically related problems.  PTR:  2 weeks.

## 2024-06-10 NOTE — PHYSICAL EXAM
[TextEntry] : Dorsalis pedis and posterior tibial pulses were palpable and equal bilat.  The capillary return was instant bilat.  The patient exhibited a flatfooted appearance upon weight bearing with collapsed medial arches and lateral bowing of the Achilles tendons. A biomechanical exam was performed on the patient.  The patient was seated in the chair.  Evaluation of the joints of the foot in passive range of motion was performed.  The sub talar joint was evaluated in both neutral and relaxed position.  The sub talar joint is noted to be in a pronated position.  There is also a mild equinus deformity noted.  Upon examination of the forefoot there is a slight forefoot varus present in compensation for the valgus position of the heel and a pronated sub talar joint.  Decreased ankle dorsiflexion was noted bilateral, the affected foot had less dorsiflexion. First metatarsal phalangeal joint dorsiflexion was limited. Resting calcaneal stance position was 5 degrees everted bilateral.  	Gait exam was also performed, which reveals an eversion of the heel bone and mid foot collapse with excessive pronation.  The forefoot is in a compensated varus position during gait as well.  Evaluation of shoe gear reveals excessive lateral heel wear bilateral with considerable breakdown of the heel counter and excessive flexibility of the soles.  There is tightness, pain, and swelling in the plantar medial aspects of both heels with increased skin temperature present.